# Patient Record
Sex: MALE | Race: ASIAN | NOT HISPANIC OR LATINO | URBAN - METROPOLITAN AREA
[De-identification: names, ages, dates, MRNs, and addresses within clinical notes are randomized per-mention and may not be internally consistent; named-entity substitution may affect disease eponyms.]

---

## 2018-10-25 ENCOUNTER — OUTPATIENT (OUTPATIENT)
Dept: OUTPATIENT SERVICES | Facility: HOSPITAL | Age: 38
LOS: 1 days | End: 2018-10-25
Payer: COMMERCIAL

## 2018-10-25 VITALS
HEART RATE: 72 BPM | RESPIRATION RATE: 16 BRPM | SYSTOLIC BLOOD PRESSURE: 109 MMHG | DIASTOLIC BLOOD PRESSURE: 76 MMHG | TEMPERATURE: 98 F | OXYGEN SATURATION: 100 % | WEIGHT: 175.93 LBS

## 2018-10-25 DIAGNOSIS — Z01.818 ENCOUNTER FOR OTHER PREPROCEDURAL EXAMINATION: ICD-10-CM

## 2018-10-25 DIAGNOSIS — J32.9 CHRONIC SINUSITIS, UNSPECIFIED: ICD-10-CM

## 2018-10-25 DIAGNOSIS — Z98.890 OTHER SPECIFIED POSTPROCEDURAL STATES: Chronic | ICD-10-CM

## 2018-10-25 LAB
APTT BLD: 35.1 SEC — SIGNIFICANT CHANGE UP (ref 27.5–37.4)
HCT VFR BLD CALC: 40.7 % — SIGNIFICANT CHANGE UP (ref 39–50)
HGB BLD-MCNC: 12.3 G/DL — LOW (ref 13–17)
INR BLD: 1.04 RATIO — SIGNIFICANT CHANGE UP (ref 0.88–1.16)
MCHC RBC-ENTMCNC: 18.9 PG — LOW (ref 27–34)
MCHC RBC-ENTMCNC: 30.2 GM/DL — LOW (ref 32–36)
MCV RBC AUTO: 62.5 FL — LOW (ref 80–100)
NRBC # BLD: 0 /100 WBCS — SIGNIFICANT CHANGE UP (ref 0–0)
PLATELET # BLD AUTO: 223 K/UL — SIGNIFICANT CHANGE UP (ref 150–400)
PROTHROM AB SERPL-ACNC: 11.4 SEC — SIGNIFICANT CHANGE UP (ref 9.8–12.7)
RBC # BLD: 6.51 M/UL — HIGH (ref 4.2–5.8)
RBC # FLD: 18.3 % — HIGH (ref 10.3–14.5)
WBC # BLD: 6.22 K/UL — SIGNIFICANT CHANGE UP (ref 3.8–10.5)
WBC # FLD AUTO: 6.22 K/UL — SIGNIFICANT CHANGE UP (ref 3.8–10.5)

## 2018-10-25 PROCEDURE — 36415 COLL VENOUS BLD VENIPUNCTURE: CPT

## 2018-10-25 PROCEDURE — G0463: CPT

## 2018-10-25 PROCEDURE — 85730 THROMBOPLASTIN TIME PARTIAL: CPT

## 2018-10-25 PROCEDURE — 85610 PROTHROMBIN TIME: CPT

## 2018-10-25 PROCEDURE — 85027 COMPLETE CBC AUTOMATED: CPT

## 2018-10-25 RX ORDER — LEVOTHYROXINE SODIUM 125 MCG
0 TABLET ORAL
Qty: 90 | Refills: 0 | COMMUNITY

## 2018-10-25 NOTE — H&P PST ADULT - FAMILY HISTORY
Mother  Still living? Yes, Estimated age: Age Unknown  Diabetes mellitus, Age at diagnosis: Age Unknown  Hyperlipidemia, Age at diagnosis: Age Unknown     Father  Still living? Yes, Estimated age: Age Unknown  Family history of leukemia, Age at diagnosis: Age Unknown     Sibling  Still living? Yes, Estimated age: Age Unknown  Hypothyroidism, Age at diagnosis: Age Unknown

## 2018-10-25 NOTE — H&P PST ADULT - PROBLEM SELECTOR PLAN 2
No medical clearance needed. CBC and PT/PTT ordered. Pre-op instructions given and pt verbalized understanding.

## 2018-10-25 NOTE — H&P PST ADULT - HISTORY OF PRESENT ILLNESS
37yo male with PMH of HLD (no meds) here for PST. Pt complaining of "difficulty smelling through both nostrils for the last 2 years". Pt s/p CT scan of sinuses in 2016 - bilateral maxillary, thmoid, sphenoid, and frontal sinusitis, bilateral opacified omu's and recesses, obstructing sinus drainage, bilateral septal deviation. Pt with chronic nasal congestion but denies sinus pain, sinus headaches and sinus infection. Pt 39yo male with PMH of HLD (no meds) here for PST. Pt complaining of "difficulty smelling through both nostrils for the last 2 years". Pt s/p CT scan of sinuses in 2016 - bilateral maxillary, thmoid, sphenoid, and frontal sinusitis, bilateral opacified omu's and recesses, obstructing sinus drainage, bilateral septal deviation. Pt with chronic nasal congestion but denies sinus pain, sinus headaches and sinus infection. Pt electing for septoplasty, bilateral submucous resection, turbinates, bilateral endoscopic sinus surgery, polyps/ethmoid/maxillary/frontal and sphenoid with propel placement on 11/1/18.

## 2018-10-25 NOTE — H&P PST ADULT - PROBLEM SELECTOR PLAN 1
Septoplasty, bilateral submucous resection, turbinates, bilateral endoscopic sinus surgery, polyps/ethmoid/maxillary/frontal and sphenoid with propel placement on 11/1/18.

## 2018-10-25 NOTE — H&P PST ADULT - NSANTHOSAYNRD_GEN_A_CORE
No. MANDY screening performed.  STOP BANG Legend: 0-2 = LOW Risk; 3-4 = INTERMEDIATE Risk; 5-8 = HIGH Risk

## 2018-10-25 NOTE — H&P PST ADULT - PMH
Chronic sinusitis, unspecified    Hyperlipidemia  (Diet controlled, no meds) Chronic sinusitis, unspecified    Hyperlipidemia  (Diet controlled, no meds)  Hypothyroidism

## 2018-10-31 ENCOUNTER — TRANSCRIPTION ENCOUNTER (OUTPATIENT)
Age: 38
End: 2018-10-31

## 2018-11-01 ENCOUNTER — OUTPATIENT (OUTPATIENT)
Dept: OUTPATIENT SERVICES | Facility: HOSPITAL | Age: 38
LOS: 1 days | End: 2018-11-01
Payer: COMMERCIAL

## 2018-11-01 ENCOUNTER — RESULT REVIEW (OUTPATIENT)
Age: 38
End: 2018-11-01

## 2018-11-01 VITALS
OXYGEN SATURATION: 100 % | SYSTOLIC BLOOD PRESSURE: 116 MMHG | RESPIRATION RATE: 16 BRPM | DIASTOLIC BLOOD PRESSURE: 77 MMHG | TEMPERATURE: 98 F | WEIGHT: 175.93 LBS | HEIGHT: 71 IN | HEART RATE: 66 BPM

## 2018-11-01 VITALS
DIASTOLIC BLOOD PRESSURE: 70 MMHG | RESPIRATION RATE: 15 BRPM | HEART RATE: 88 BPM | SYSTOLIC BLOOD PRESSURE: 121 MMHG | OXYGEN SATURATION: 96 %

## 2018-11-01 DIAGNOSIS — Z01.818 ENCOUNTER FOR OTHER PREPROCEDURAL EXAMINATION: ICD-10-CM

## 2018-11-01 DIAGNOSIS — J32.9 CHRONIC SINUSITIS, UNSPECIFIED: ICD-10-CM

## 2018-11-01 DIAGNOSIS — Z98.890 OTHER SPECIFIED POSTPROCEDURAL STATES: Chronic | ICD-10-CM

## 2018-11-01 PROCEDURE — 30520 REPAIR OF NASAL SEPTUM: CPT

## 2018-11-01 PROCEDURE — 30140 RESECT INFERIOR TURBINATE: CPT | Mod: 50

## 2018-11-01 PROCEDURE — 31267 ENDOSCOPY MAXILLARY SINUS: CPT | Mod: 50

## 2018-11-01 PROCEDURE — 88305 TISSUE EXAM BY PATHOLOGIST: CPT

## 2018-11-01 PROCEDURE — 88311 DECALCIFY TISSUE: CPT | Mod: 26

## 2018-11-01 PROCEDURE — 88305 TISSUE EXAM BY PATHOLOGIST: CPT | Mod: 26

## 2018-11-01 PROCEDURE — 31253 NSL/SINS NDSC TOTAL: CPT | Mod: 50

## 2018-11-01 PROCEDURE — 88311 DECALCIFY TISSUE: CPT

## 2018-11-01 PROCEDURE — C2625: CPT

## 2018-11-01 RX ORDER — ONDANSETRON 8 MG/1
4 TABLET, FILM COATED ORAL ONCE
Qty: 0 | Refills: 0 | Status: DISCONTINUED | OUTPATIENT
Start: 2018-11-01 | End: 2018-11-01

## 2018-11-01 RX ORDER — LEVOTHYROXINE SODIUM 125 MCG
0 TABLET ORAL
Qty: 0 | Refills: 0 | COMMUNITY

## 2018-11-01 RX ORDER — TRAMADOL HYDROCHLORIDE 50 MG/1
1 TABLET ORAL
Qty: 24 | Refills: 0
Start: 2018-11-01 | End: 2018-11-04

## 2018-11-01 RX ORDER — SODIUM CHLORIDE 9 MG/ML
1000 INJECTION, SOLUTION INTRAVENOUS
Qty: 0 | Refills: 0 | Status: DISCONTINUED | OUTPATIENT
Start: 2018-11-01 | End: 2018-11-01

## 2018-11-01 RX ORDER — HYDROMORPHONE HYDROCHLORIDE 2 MG/ML
0.5 INJECTION INTRAMUSCULAR; INTRAVENOUS; SUBCUTANEOUS
Qty: 0 | Refills: 0 | Status: DISCONTINUED | OUTPATIENT
Start: 2018-11-01 | End: 2018-11-01

## 2018-11-01 RX ORDER — FLUTICASONE PROPIONATE 50 MCG
1 SPRAY, SUSPENSION NASAL
Qty: 0 | Refills: 0 | COMMUNITY

## 2018-11-01 RX ORDER — ACETAMINOPHEN 500 MG
1000 TABLET ORAL ONCE
Qty: 0 | Refills: 0 | Status: COMPLETED | OUTPATIENT
Start: 2018-11-01 | End: 2018-11-01

## 2018-11-01 RX ORDER — CEFAZOLIN SODIUM 1 G
1000 VIAL (EA) INJECTION ONCE
Qty: 0 | Refills: 0 | Status: COMPLETED | OUTPATIENT
Start: 2018-11-01 | End: 2018-11-01

## 2018-11-01 RX ADMIN — Medication 1000 MILLIGRAM(S): at 17:02

## 2018-11-01 RX ADMIN — SODIUM CHLORIDE 75 MILLILITER(S): 9 INJECTION, SOLUTION INTRAVENOUS at 16:52

## 2018-11-01 RX ADMIN — Medication 400 MILLIGRAM(S): at 16:52

## 2018-11-01 RX ADMIN — SODIUM CHLORIDE 75 MILLILITER(S): 9 INJECTION, SOLUTION INTRAVENOUS at 12:01

## 2018-11-01 NOTE — BRIEF OPERATIVE NOTE - PROCEDURE
<<-----Click on this checkbox to enter Procedure Turbinoplasty  11/01/2018    Active  ALYSSIADEBRA  Sinus surgery, bilateral, initial, endoscopic, with polypectomy  11/01/2018    Active  ALYSSIADEBRA

## 2018-11-01 NOTE — BRIEF OPERATIVE NOTE - PRE-OP DX
Chronic sinusitis of both maxillary sinuses  11/01/2018    Patric Wylie  Turbinoseptal adhesions  11/01/2018    Active  Patric Arnold

## 2018-11-01 NOTE — ASU DISCHARGE PLAN (ADULT/PEDIATRIC). - MEDICATION SUMMARY - MEDICATIONS TO TAKE
I will START or STAY ON the medications listed below when I get home from the hospital:    Ultram 50 mg oral tablet  -- 1 tab(s) by mouth every 4 hours, As Needed -for severe pain MDD:4 /day  -- Caution federal law prohibits the transfer of this drug to any person other  than the person for whom it was prescribed.  May cause drowsiness.  Alcohol may intensify this effect.  Use care when operating dangerous machinery.  Obtain medical advice before taking any non-prescription drugs as some may affect the action of this medication.    -- Indication: For Chronic sinusitis    cefadroxil 500 mg oral capsule  -- 1 cap(s) by mouth every 12 hours   -- Finish all this medication unless otherwise directed by prescriber.    -- Indication: For Chronic sinusitis

## 2018-11-01 NOTE — ASU DISCHARGE PLAN (ADULT/PEDIATRIC). - YOU WERE IN THE HOSPITAL FOR:
sinus surgery sinus surgery Turbinoplasty  Sinus surgery, bilateral, initial, endoscopic, with polypectomy

## 2018-11-01 NOTE — ASU DISCHARGE PLAN (ADULT/PEDIATRIC). - NOTIFY
Numbness, color, or temperature change to extremity/Bleeding that does not stop/Swelling that continues/Persistent Nausea and Vomiting Bleeding that does not stop/Swelling that continues/Persistent Nausea and Vomiting/Fever greater than 101/Numbness, color, or temperature change to extremity

## 2020-08-30 ENCOUNTER — EMERGENCY (EMERGENCY)
Facility: HOSPITAL | Age: 40
LOS: 1 days | Discharge: HOME | End: 2020-08-30
Attending: EMERGENCY MEDICINE | Admitting: EMERGENCY MEDICINE
Payer: COMMERCIAL

## 2020-08-30 VITALS
WEIGHT: 166.01 LBS | OXYGEN SATURATION: 98 % | SYSTOLIC BLOOD PRESSURE: 134 MMHG | HEART RATE: 92 BPM | RESPIRATION RATE: 18 BRPM | DIASTOLIC BLOOD PRESSURE: 81 MMHG | TEMPERATURE: 98 F

## 2020-08-30 VITALS
TEMPERATURE: 98 F | RESPIRATION RATE: 18 BRPM | SYSTOLIC BLOOD PRESSURE: 128 MMHG | DIASTOLIC BLOOD PRESSURE: 79 MMHG | HEART RATE: 81 BPM | OXYGEN SATURATION: 99 %

## 2020-08-30 DIAGNOSIS — Z98.890 OTHER SPECIFIED POSTPROCEDURAL STATES: Chronic | ICD-10-CM

## 2020-08-30 PROBLEM — J32.9 CHRONIC SINUSITIS, UNSPECIFIED: Chronic | Status: ACTIVE | Noted: 2018-10-25

## 2020-08-30 PROBLEM — E03.9 HYPOTHYROIDISM, UNSPECIFIED: Chronic | Status: ACTIVE | Noted: 2018-10-25

## 2020-08-30 PROBLEM — E78.5 HYPERLIPIDEMIA, UNSPECIFIED: Chronic | Status: ACTIVE | Noted: 2018-10-25

## 2020-08-30 LAB
ALBUMIN SERPL ELPH-MCNC: 4.4 G/DL — SIGNIFICANT CHANGE UP (ref 3.3–5)
ALP SERPL-CCNC: 99 U/L — SIGNIFICANT CHANGE UP (ref 40–120)
ALT FLD-CCNC: 29 U/L — SIGNIFICANT CHANGE UP (ref 12–78)
ANION GAP SERPL CALC-SCNC: 2 MMOL/L — LOW (ref 5–17)
AST SERPL-CCNC: 23 U/L — SIGNIFICANT CHANGE UP (ref 15–37)
BASOPHILS # BLD AUTO: 0.04 K/UL — SIGNIFICANT CHANGE UP (ref 0–0.2)
BASOPHILS NFR BLD AUTO: 0.7 % — SIGNIFICANT CHANGE UP (ref 0–2)
BILIRUB SERPL-MCNC: 0.7 MG/DL — SIGNIFICANT CHANGE UP (ref 0.2–1.2)
BUN SERPL-MCNC: 13 MG/DL — SIGNIFICANT CHANGE UP (ref 7–23)
CALCIUM SERPL-MCNC: 9.4 MG/DL — SIGNIFICANT CHANGE UP (ref 8.5–10.1)
CHLORIDE SERPL-SCNC: 106 MMOL/L — SIGNIFICANT CHANGE UP (ref 96–108)
CO2 SERPL-SCNC: 30 MMOL/L — SIGNIFICANT CHANGE UP (ref 22–31)
CREAT SERPL-MCNC: 0.85 MG/DL — SIGNIFICANT CHANGE UP (ref 0.5–1.3)
D DIMER BLD IA.RAPID-MCNC: <150 NG/ML DDU — SIGNIFICANT CHANGE UP
EOSINOPHIL # BLD AUTO: 0.32 K/UL — SIGNIFICANT CHANGE UP (ref 0–0.5)
EOSINOPHIL NFR BLD AUTO: 5.2 % — SIGNIFICANT CHANGE UP (ref 0–6)
GLUCOSE SERPL-MCNC: 93 MG/DL — SIGNIFICANT CHANGE UP (ref 70–99)
HCT VFR BLD CALC: 40.2 % — SIGNIFICANT CHANGE UP (ref 39–50)
HGB BLD-MCNC: 12.4 G/DL — LOW (ref 13–17)
IMM GRANULOCYTES NFR BLD AUTO: 0.2 % — SIGNIFICANT CHANGE UP (ref 0–1.5)
LIDOCAIN IGE QN: 174 U/L — SIGNIFICANT CHANGE UP (ref 73–393)
LYMPHOCYTES # BLD AUTO: 3.52 K/UL — HIGH (ref 1–3.3)
LYMPHOCYTES # BLD AUTO: 57.7 % — HIGH (ref 13–44)
MAGNESIUM SERPL-MCNC: 2.1 MG/DL — SIGNIFICANT CHANGE UP (ref 1.6–2.6)
MCHC RBC-ENTMCNC: 19.1 PG — LOW (ref 27–34)
MCHC RBC-ENTMCNC: 30.8 GM/DL — LOW (ref 32–36)
MCV RBC AUTO: 61.9 FL — LOW (ref 80–100)
MONOCYTES # BLD AUTO: 0.46 K/UL — SIGNIFICANT CHANGE UP (ref 0–0.9)
MONOCYTES NFR BLD AUTO: 7.5 % — SIGNIFICANT CHANGE UP (ref 2–14)
NEUTROPHILS # BLD AUTO: 1.75 K/UL — LOW (ref 1.8–7.4)
NEUTROPHILS NFR BLD AUTO: 28.7 % — LOW (ref 43–77)
NRBC # BLD: 0 /100 WBCS — SIGNIFICANT CHANGE UP (ref 0–0)
PLATELET # BLD AUTO: 240 K/UL — SIGNIFICANT CHANGE UP (ref 150–400)
POTASSIUM SERPL-MCNC: 4 MMOL/L — SIGNIFICANT CHANGE UP (ref 3.5–5.3)
POTASSIUM SERPL-SCNC: 4 MMOL/L — SIGNIFICANT CHANGE UP (ref 3.5–5.3)
PROT SERPL-MCNC: 8.2 G/DL — SIGNIFICANT CHANGE UP (ref 6–8.3)
RBC # BLD: 6.49 M/UL — HIGH (ref 4.2–5.8)
RBC # FLD: 18.1 % — HIGH (ref 10.3–14.5)
SODIUM SERPL-SCNC: 138 MMOL/L — SIGNIFICANT CHANGE UP (ref 135–145)
TROPONIN I SERPL-MCNC: <.015 NG/ML — SIGNIFICANT CHANGE UP (ref 0.01–0.04)
WBC # BLD: 6.1 K/UL — SIGNIFICANT CHANGE UP (ref 3.8–10.5)
WBC # FLD AUTO: 6.1 K/UL — SIGNIFICANT CHANGE UP (ref 3.8–10.5)

## 2020-08-30 PROCEDURE — 84484 ASSAY OF TROPONIN QUANT: CPT

## 2020-08-30 PROCEDURE — 83735 ASSAY OF MAGNESIUM: CPT

## 2020-08-30 PROCEDURE — 80053 COMPREHEN METABOLIC PANEL: CPT

## 2020-08-30 PROCEDURE — 99284 EMERGENCY DEPT VISIT MOD MDM: CPT

## 2020-08-30 PROCEDURE — 99284 EMERGENCY DEPT VISIT MOD MDM: CPT | Mod: 25

## 2020-08-30 PROCEDURE — 71046 X-RAY EXAM CHEST 2 VIEWS: CPT

## 2020-08-30 PROCEDURE — 93005 ELECTROCARDIOGRAM TRACING: CPT

## 2020-08-30 PROCEDURE — 83690 ASSAY OF LIPASE: CPT

## 2020-08-30 PROCEDURE — 85379 FIBRIN DEGRADATION QUANT: CPT

## 2020-08-30 PROCEDURE — 85027 COMPLETE CBC AUTOMATED: CPT

## 2020-08-30 PROCEDURE — 71046 X-RAY EXAM CHEST 2 VIEWS: CPT | Mod: 26

## 2020-08-30 PROCEDURE — 36415 COLL VENOUS BLD VENIPUNCTURE: CPT

## 2020-08-30 PROCEDURE — 93010 ELECTROCARDIOGRAM REPORT: CPT

## 2020-08-30 RX ORDER — SODIUM CHLORIDE 9 MG/ML
3 INJECTION INTRAMUSCULAR; INTRAVENOUS; SUBCUTANEOUS ONCE
Refills: 0 | Status: COMPLETED | OUTPATIENT
Start: 2020-08-30 | End: 2020-08-30

## 2020-08-30 RX ORDER — LEVOTHYROXINE SODIUM 125 MCG
1 TABLET ORAL
Qty: 0 | Refills: 0 | DISCHARGE

## 2020-08-30 RX ORDER — KETOROLAC TROMETHAMINE 30 MG/ML
30 SYRINGE (ML) INJECTION ONCE
Refills: 0 | Status: DISCONTINUED | OUTPATIENT
Start: 2020-08-30 | End: 2020-08-30

## 2020-08-30 RX ADMIN — SODIUM CHLORIDE 3 MILLILITER(S): 9 INJECTION INTRAMUSCULAR; INTRAVENOUS; SUBCUTANEOUS at 03:19

## 2020-08-30 NOTE — ED PROVIDER NOTE - ACUTE OR EVOLVING MI?
Call to patient's daughter on hippa  verified. Informed her she needs to see pulmonology.  She understands no

## 2020-08-30 NOTE — ED PROVIDER NOTE - PHYSICAL EXAMINATION
no CP upon leaning forward or lying back   no skin rashes noted  NVI to UE and LE bilaterally  Pt ambulating in room with no signs of respiratory distress, no hypoxia noted  Lungs are CTA with no wheezing noted

## 2020-08-30 NOTE — ED PROVIDER NOTE - CARE PROVIDER_API CALL
Russell Palacios)  Internal Medicine  [address] [place] [address]  Phone: [phone]  Fax: [phone]  Follow Up Time:

## 2020-08-30 NOTE — ED PROVIDER NOTE - NEUROLOGICAL, MLM
Alert and oriented, no focal deficits, no motor or sensory deficits. FROM of UE and LE bilaterally, CN II-XII intact

## 2020-08-30 NOTE — ED PROVIDER NOTE - NSFOLLOWUPINSTRUCTIONS_ED_ALL_ED_FT
Return to the ED for any new or worsening symptoms  Take your medication as prescribed  Follow up with cardiology as an outpatient call on Monday to schedule follow up  Advance activity as tolerated  Follow up with your PMD on Monday for a recheck

## 2020-08-30 NOTE — ED PROVIDER NOTE - OBJECTIVE STATEMENT
Pt is a 40 yo male who presents to the ED with a cc of SOB.  PMHx of hypothyroidism.  Pt states that he first noted mid sternal chest pain on the 26th with associated SOB.  He reports that the pain was worse with movement and felt like he "pulled" a muscle.  Pt states that the pain continued through the 27th but has since resolved although he is experiencing continued SOB.  Today he also noted some discomfort down his left arm.  Denies any acute trauma to the chest or arm.  Denies prior similar symptoms.  Denies any known underlying cardiac disease.  Pain is not pleuritic in nature.  Denies recent travel, long car rides, or sick contacts. Pt has not taken anything for the symptoms.  Denies fever, chills, N/V/D/C, abd pain, ext numbness.  Pt reports that there is no cough related to his symptoms.  SOB is not worsened with exertion.

## 2020-08-30 NOTE — ED PROVIDER NOTE - PATIENT PORTAL LINK FT
You can access the FollowMyHealth Patient Portal offered by [facility] by registering at the following website: [email or web address]. By joining [employer]’s FollowMyHealth portal, you will also be able to view your health information using other applications (apps) compatible with our system.

## 2020-08-30 NOTE — ED ADULT TRIAGE NOTE - CHIEF COMPLAINT QUOTE
C/O SOB x 4 days along with left upper arm pain. States he had chest discomfort 4 days ago which resolved. Denies any cardiac hx.

## 2020-08-30 NOTE — ED ADULT NURSE NOTE - OBJECTIVE STATEMENT
Received patient awake and alert x 4, presenting to the ED with SOB and chest discomfort x 4 days. States the pain started 4 hours ago and resolved. denies any cardiac hx. Denies any fever/chills, no other complaints, safety maintained.

## 2020-08-30 NOTE — ED PROVIDER NOTE - CLINICAL SUMMARY MEDICAL DECISION MAKING FREE TEXT BOX
Pt is a 38 yo male who presents to the ED with a cc of SOB.  PMHx of hypothyroidism.  Pt states that he first noted mid sternal chest pain on the 26th with associated SOB.  He reports that the pain was worse with movement and felt like he "pulled" a muscle.  Pt states that the pain continued through the 27th but has since resolved although he is experiencing continued SOB.  Today he also noted some discomfort down his left arm.  Denies any acute trauma to the chest or arm.  Denies prior similar symptoms.  Denies any known underlying cardiac disease.  Pain is not pleuritic in nature.  Denies recent travel, long car rides, or sick contacts. Pt has not taken anything for the symptoms.  Denies fever, chills, N/V/D/C, abd pain, ext numbness.  Pt reports that there is no cough related to his symptoms.  SOB is not worsened with exertion. Pt with reported chest discomfort and SOB x days no signs or symptoms concerning for infectious process.  Will obtain screening labs, EKG, chest x-ray, check d dimer and monitor.  If d dimer is elevated pt will need CTA chest.  As symptoms have been ongoing for days pt will require only one troponin for discharge

## 2020-12-25 ENCOUNTER — EMERGENCY (EMERGENCY)
Facility: HOSPITAL | Age: 40
LOS: 1 days | Discharge: HOME | End: 2020-12-25
Attending: EMERGENCY MEDICINE | Admitting: EMERGENCY MEDICINE
Payer: COMMERCIAL

## 2020-12-25 VITALS
OXYGEN SATURATION: 97 % | RESPIRATION RATE: 17 BRPM | HEART RATE: 89 BPM | SYSTOLIC BLOOD PRESSURE: 115 MMHG | TEMPERATURE: 98 F | DIASTOLIC BLOOD PRESSURE: 62 MMHG

## 2020-12-25 VITALS
DIASTOLIC BLOOD PRESSURE: 79 MMHG | HEIGHT: 71 IN | WEIGHT: 171.96 LBS | OXYGEN SATURATION: 97 % | HEART RATE: 96 BPM | RESPIRATION RATE: 18 BRPM | TEMPERATURE: 99 F | SYSTOLIC BLOOD PRESSURE: 121 MMHG

## 2020-12-25 DIAGNOSIS — Z98.890 OTHER SPECIFIED POSTPROCEDURAL STATES: Chronic | ICD-10-CM

## 2020-12-25 LAB
APPEARANCE UR: ABNORMAL
BACTERIA # UR AUTO: ABNORMAL
BILIRUB UR-MCNC: NEGATIVE — SIGNIFICANT CHANGE UP
COLOR SPEC: YELLOW — SIGNIFICANT CHANGE UP
DIFF PNL FLD: NEGATIVE — SIGNIFICANT CHANGE UP
EPI CELLS # UR: SIGNIFICANT CHANGE UP
GLUCOSE UR QL: NEGATIVE — SIGNIFICANT CHANGE UP
KETONES UR-MCNC: NEGATIVE — SIGNIFICANT CHANGE UP
LEUKOCYTE ESTERASE UR-ACNC: NEGATIVE — SIGNIFICANT CHANGE UP
NITRITE UR-MCNC: NEGATIVE — SIGNIFICANT CHANGE UP
PH UR: 8 — SIGNIFICANT CHANGE UP (ref 5–8)
PROT UR-MCNC: 30 MG/DL
RBC CASTS # UR COMP ASSIST: ABNORMAL /HPF (ref 0–4)
SP GR SPEC: 1 — LOW (ref 1.01–1.02)
UROBILINOGEN FLD QL: NEGATIVE — SIGNIFICANT CHANGE UP
WBC UR QL: ABNORMAL

## 2020-12-25 PROCEDURE — 96372 THER/PROPH/DIAG INJ SC/IM: CPT

## 2020-12-25 PROCEDURE — 71045 X-RAY EXAM CHEST 1 VIEW: CPT

## 2020-12-25 PROCEDURE — 71045 X-RAY EXAM CHEST 1 VIEW: CPT | Mod: 26

## 2020-12-25 PROCEDURE — 81001 URINALYSIS AUTO W/SCOPE: CPT

## 2020-12-25 PROCEDURE — 99284 EMERGENCY DEPT VISIT MOD MDM: CPT | Mod: 25

## 2020-12-25 PROCEDURE — 99283 EMERGENCY DEPT VISIT LOW MDM: CPT | Mod: 25

## 2020-12-25 PROCEDURE — 94640 AIRWAY INHALATION TREATMENT: CPT

## 2020-12-25 RX ORDER — CYCLOBENZAPRINE HYDROCHLORIDE 10 MG/1
1 TABLET, FILM COATED ORAL
Qty: 30 | Refills: 0
Start: 2020-12-25

## 2020-12-25 RX ORDER — ALBUTEROL 90 UG/1
2 AEROSOL, METERED ORAL ONCE
Refills: 0 | Status: COMPLETED | OUTPATIENT
Start: 2020-12-25 | End: 2020-12-25

## 2020-12-25 RX ORDER — KETOROLAC TROMETHAMINE 30 MG/ML
60 SYRINGE (ML) INJECTION ONCE
Refills: 0 | Status: DISCONTINUED | OUTPATIENT
Start: 2020-12-25 | End: 2020-12-25

## 2020-12-25 RX ORDER — IBUPROFEN 200 MG
1 TABLET ORAL
Qty: 30 | Refills: 0
Start: 2020-12-25

## 2020-12-25 RX ADMIN — Medication 60 MILLIGRAM(S): at 10:40

## 2020-12-25 RX ADMIN — ALBUTEROL 2 PUFF(S): 90 AEROSOL, METERED ORAL at 10:40

## 2020-12-25 NOTE — ED PROVIDER NOTE - ENMT, MLM
Airway patent, Nasal mucosa clear. Mouth with normal mucosa. Throat has no vesicles, no oropharyngeal exudates and uvula is midline. no g f r no cyanosis

## 2020-12-25 NOTE — ED PROVIDER NOTE - OBJECTIVE STATEMENT
pt is a 39 yo m who was diagnosed with covid on Tuesday (4 d ago) after being evaluated at [facility] for cough and flu like sx. he was doing well with cough until this am when he went to sit on toilet.  he developed sudden severe low back pain. the pain was excruciating. It caused him to develop a cold sweat he felt like he could not catch his breath.  these sx lasted for 5 minutes, during which time his wife called 911.  by the time ems arrived he felt better.  he is very concerned about his inability to breath so he came to er for eval. per ems pt has normal vitals  walked to ambulance and was ambulated from ambulance to er for eval.]  pcp is [place] no allergies not a smoker

## 2020-12-25 NOTE — ED PROVIDER NOTE - NSFOLLOWUPINSTRUCTIONS_ED_ALL_ED_FT
rest  light activity as tolerated  follow up with your primary care doctor  follow up with dr orlando on call orhtopekuldeep  ibuprofen for pain  flexeril for spasm  take a baby aspirin daily    PROMPT FOLLOW UP WITH YOUR DOCTOR IS ESSENTIAL  IN ACCORDANCE WITH [facility] AND CDC GUIDELINES: YOU MUST REMAIN HOME IN QUARANTINE UNTIL YOU ARE 7 DAYS POST ONSET OF SYMPTOMS AND HAVE NO FEVER WITHOUT ANY FEVER REDUCING MEDS FOR 72 HOURS  SHOULD YOUR SYMPTOMS WORSEN OR FOR ANY CONCERNS CONTACT YOUR PHYSICIAN OR RETURN  Back Pain    Back pain is very common in adults. The cause of back pain is rarely dangerous and the pain often gets better over time. The cause of your back pain may not be known and may include strain of muscles or ligaments, degeneration of the spinal disks, or arthritis. Occasionally the pain may radiate down your leg(s). Over-the-counter medicines to reduce pain and inflammation are often the most helpful. Stretching and remaining active frequently helps the healing process.     SEEK IMMEDIATE MEDICAL CARE IF YOU HAVE ANY OF THE FOLLOWING SYMPTOMS: bowel or bladder control problems, unusual weakness or numbness in your arms or legs, nausea or vomiting, abdominal pain, fever, dizziness/lightheadedness.

## 2020-12-25 NOTE — ED PROVIDER NOTE - MUSCULOSKELETAL, MLM
Spine appears normal, range of motion is not limited, no muscle or joint tenderness except to paraspinal lumbar spine has spasm

## 2020-12-25 NOTE — ED PROVIDER NOTE - CONSTITUTIONAL, MLM
normal... Well appearing, awake, alert, oriented to person, place, time/situation and in no apparent distress. with occasional cough

## 2020-12-25 NOTE — ED PROVIDER NOTE - CARE PROVIDER_API CALL
Hiram Henry (MD)  Orthopaedic Surgery  [address] [place] [address]  Phone: [phone]  Fax: [phone]  Follow Up Time:     Dylon Raphael  INTERNAL MEDICINE  [facility], [address]rd [place] [address]  Phone: [phone]  Fax: [phone]  Follow Up Time:

## 2020-12-25 NOTE — ED ADULT NURSE NOTE - NSIMPLEMENTINTERV_GEN_ALL_ED
Implemented All Universal Safety Interventions:  [place] to call system. Call bell, personal items and telephone within reach. Instruct patient to call for assistance. Room bathroom lighting operational. Non-slip footwear when patient is off stretcher. Physically safe environment: no spills, clutter or unnecessary equipment. Stretcher in lowest position, wheels locked, appropriate side rails in place.

## 2020-12-25 NOTE — ED PROVIDER NOTE - CHPI ED SYMPTOMS NEG
no anorexia/no bladder dysfunction/no bowel dysfunction/no constipation/no fatigue/no neck tenderness/no tingling/no difficulty bearing weight/no motor function loss

## 2020-12-25 NOTE — ED ADULT NURSE NOTE - OBJECTIVE STATEMENT
pt with complaints of sudden onset lower back pain 10/10, and sweats, called ambulance and by time pt got here, sweats resolved and pain decreased to 4/40. pt also had episode of shortness of breath.

## 2020-12-25 NOTE — ED ADULT TRIAGE NOTE - CHIEF COMPLAINT QUOTE
BIB EMS for single episode of sweating and 10/10 low back pain pt states "the pain was so bad I couldn't breath". Pain and sweating resolved w/o intervention. Covid +

## 2020-12-25 NOTE — ED PROVIDER NOTE - PATIENT PORTAL LINK FT
You can access the FollowMyHealth Patient Portal offered by [facility] by registering at the following website: [email or web address]. By joining [employer]’s FollowMyHealth portal, you will also be able to view your health information using other applications (apps) compatible with our system.

## 2020-12-28 ENCOUNTER — TRANSCRIPTION ENCOUNTER (OUTPATIENT)
Age: 40
End: 2020-12-28

## 2021-01-04 ENCOUNTER — TRANSCRIPTION ENCOUNTER (OUTPATIENT)
Age: 41
End: 2021-01-04

## 2021-02-24 NOTE — ED PROVIDER NOTE - HIV OFFER
Called [employer] and spoke with Robinson. States results from PT/INR from 02/23/2021 are available and he will fax them to [phone].    TRIAGE: Please watch for fax from [employer] and notify Mid-Level provider of result.   Previously Declined (within the last year)

## 2021-11-12 PROBLEM — Z00.00 ENCOUNTER FOR PREVENTIVE HEALTH EXAMINATION: Status: ACTIVE | Noted: 2021-11-12

## 2021-11-15 ENCOUNTER — APPOINTMENT (OUTPATIENT)
Dept: OTOLARYNGOLOGY | Facility: CLINIC | Age: 41
End: 2021-11-15
Payer: COMMERCIAL

## 2021-11-15 VITALS
SYSTOLIC BLOOD PRESSURE: 113 MMHG | WEIGHT: 175 LBS | BODY MASS INDEX: 24.5 KG/M2 | DIASTOLIC BLOOD PRESSURE: 75 MMHG | HEART RATE: 69 BPM | HEIGHT: 71 IN

## 2021-11-15 DIAGNOSIS — R06.83 SNORING: ICD-10-CM

## 2021-11-15 PROCEDURE — 99214 OFFICE O/P EST MOD 30 MIN: CPT | Mod: 25

## 2021-11-15 PROCEDURE — 31231 NASAL ENDOSCOPY DX: CPT | Mod: 52

## 2021-11-15 NOTE — REASON FOR VISIT
[Subsequent Evaluation] : a subsequent evaluation for [FreeTextEntry2] : nasal polyp, snoring problem

## 2021-11-26 ENCOUNTER — APPOINTMENT (OUTPATIENT)
Age: 41
End: 2021-11-26
Payer: COMMERCIAL

## 2021-11-26 ENCOUNTER — OUTPATIENT (OUTPATIENT)
Dept: OUTPATIENT SERVICES | Facility: HOSPITAL | Age: 41
LOS: 1 days | End: 2021-11-26
Payer: COMMERCIAL

## 2021-11-26 DIAGNOSIS — G47.33 OBSTRUCTIVE SLEEP APNEA (ADULT) (PEDIATRIC): ICD-10-CM

## 2021-11-26 DIAGNOSIS — Z98.890 OTHER SPECIFIED POSTPROCEDURAL STATES: Chronic | ICD-10-CM

## 2021-11-26 PROCEDURE — 95800 SLP STDY UNATTENDED: CPT

## 2021-11-26 PROCEDURE — G0400: CPT

## 2021-12-06 ENCOUNTER — APPOINTMENT (OUTPATIENT)
Dept: OTOLARYNGOLOGY | Facility: CLINIC | Age: 41
End: 2021-12-06
Payer: COMMERCIAL

## 2021-12-06 VITALS
HEART RATE: 69 BPM | DIASTOLIC BLOOD PRESSURE: 78 MMHG | SYSTOLIC BLOOD PRESSURE: 115 MMHG | BODY MASS INDEX: 25.2 KG/M2 | WEIGHT: 180 LBS | HEIGHT: 71 IN

## 2021-12-06 DIAGNOSIS — J33.9 NASAL POLYP, UNSPECIFIED: ICD-10-CM

## 2021-12-06 PROCEDURE — 99214 OFFICE O/P EST MOD 30 MIN: CPT

## 2021-12-06 NOTE — HISTORY OF PRESENT ILLNESS
[de-identified] : 41 yr old male s/p nasal polypectomy and inferior turbinate reduction 11/2018\par CT PNS 9/2019 +complete opacification of ethmoid and frontal sinuses\par -sinusitis\par +recurrence of small polyps, was started on Flonase 11/15 with improved sense of smell\par breathes well through his nose during the day \par \par +snoring, talks in his sleep, fitful sleeping, daytime fatigue, naps\par sleep study + mild MANDY\par 5'11"\par 175lbs (stable)

## 2022-06-23 ENCOUNTER — APPOINTMENT (OUTPATIENT)
Dept: OTOLARYNGOLOGY | Facility: CLINIC | Age: 42
End: 2022-06-23
Payer: COMMERCIAL

## 2022-06-23 VITALS
HEART RATE: 65 BPM | BODY MASS INDEX: 25.2 KG/M2 | WEIGHT: 180 LBS | SYSTOLIC BLOOD PRESSURE: 114 MMHG | DIASTOLIC BLOOD PRESSURE: 71 MMHG | HEIGHT: 71 IN

## 2022-06-23 DIAGNOSIS — G47.33 OBSTRUCTIVE SLEEP APNEA (ADULT) (PEDIATRIC): ICD-10-CM

## 2022-06-23 PROCEDURE — 99213 OFFICE O/P EST LOW 20 MIN: CPT

## 2022-06-23 RX ORDER — FLUTICASONE PROPIONATE 50 UG/1
50 SPRAY, METERED NASAL
Qty: 3 | Refills: 3 | Status: DISCONTINUED | COMMUNITY
Start: 2021-11-15 | End: 2022-06-23

## 2022-06-23 RX ORDER — LEVOTHYROXINE SODIUM 137 UG/1
TABLET ORAL
Refills: 0 | Status: ACTIVE | COMMUNITY

## 2022-06-23 RX ORDER — METHYLPREDNISOLONE 4 MG/1
4 TABLET ORAL
Qty: 21 | Refills: 0 | Status: DISCONTINUED | COMMUNITY
Start: 2022-02-02

## 2022-06-23 RX ORDER — CYCLOBENZAPRINE HYDROCHLORIDE 10 MG/1
10 TABLET, FILM COATED ORAL
Qty: 40 | Refills: 0 | Status: DISCONTINUED | COMMUNITY
Start: 2022-02-02

## 2022-06-23 NOTE — HISTORY OF PRESENT ILLNESS
[de-identified] : 41 yr old male w mild MANDY, didn't follow up w [facility] for oral appliance due to fear of dentists\par Prefers to try CPAP.

## 2022-08-01 NOTE — ED PROVIDER NOTE - PROGRESS NOTE DETAILS
English pt reports that he is pain free at this time.  Refused Toradol.  Results of labs and images reviewed, copy provided.  Advised to follow up with cardiology as outpatient.  Referral provided. All questions answered.  Pt with no chest pain upon leaning forward or lying back. Pt reports that the pain to his left arm has resolved

## 2024-01-11 NOTE — BRIEF OPERATIVE NOTE - POST-OP DX
Faxed orders for abdominal MRI, bone density, and chest x-ray to [facility] at [phone].  
Chronic maxillary sinusitis  11/01/2018    Patric Wylie  Nasal polyps  11/01/2018    Patric Wylie  Turbinoseptal adhesions  11/01/2018    Patric Wylie
PAST MEDICAL HISTORY:  Bipolar disorder     Hypertension     Pericarditis

## 2024-10-29 NOTE — ED ADULT TRIAGE NOTE - ESI TRIAGE ACUITY LEVEL, MLM
[FreeTextEntry1] :  CAROLYN is a 11-year-old boy now s/p three port laparoscopic appendectomy for acute appendicitis with Dr. Rosenberg on 10/9 who presents today for a routine post op check. He has recovered well from surgery.  He is tolerating a regular diet, having regular bowel movements and is back to his usual activities. On exam the incision is well healed. We reviewed the pathology which confirms the presence of appendicitis.  A copy of the report was provided to the family.  The patient was reminded to let all HCP know that they had surgery and no longer has an appendix. He is cleared to resume full physical activities and submerge in water.    Follow up with the pediatrician as usual and with pediatric surgery should any new or concerning symptoms arise. All questions answered. 4